# Patient Record
Sex: MALE | Race: WHITE | NOT HISPANIC OR LATINO | Employment: OTHER | URBAN - METROPOLITAN AREA
[De-identification: names, ages, dates, MRNs, and addresses within clinical notes are randomized per-mention and may not be internally consistent; named-entity substitution may affect disease eponyms.]

---

## 2022-06-13 ENCOUNTER — TELEPHONE (OUTPATIENT)
Dept: SCHEDULING | Facility: IMAGING CENTER | Age: 47
End: 2022-06-13

## 2022-06-14 ENCOUNTER — OFFICE VISIT (OUTPATIENT)
Dept: MEDICAL GROUP | Facility: PHYSICIAN GROUP | Age: 47
End: 2022-06-14
Payer: COMMERCIAL

## 2022-06-14 VITALS
TEMPERATURE: 97.3 F | BODY MASS INDEX: 26.45 KG/M2 | WEIGHT: 178.6 LBS | HEIGHT: 69 IN | OXYGEN SATURATION: 96 % | HEART RATE: 78 BPM | DIASTOLIC BLOOD PRESSURE: 84 MMHG | RESPIRATION RATE: 12 BRPM | SYSTOLIC BLOOD PRESSURE: 130 MMHG

## 2022-06-14 DIAGNOSIS — F41.8 DEPRESSION WITH ANXIETY: ICD-10-CM

## 2022-06-14 DIAGNOSIS — Z00.00 HEALTHCARE MAINTENANCE: ICD-10-CM

## 2022-06-14 DIAGNOSIS — F41.0 PANIC DISORDER WITHOUT AGORAPHOBIA: ICD-10-CM

## 2022-06-14 DIAGNOSIS — Z11.3 SCREENING EXAMINATION FOR STD (SEXUALLY TRANSMITTED DISEASE): ICD-10-CM

## 2022-06-14 DIAGNOSIS — F10.20 UNCOMPLICATED ALCOHOL DEPENDENCE (HCC): ICD-10-CM

## 2022-06-14 PROCEDURE — 99203 OFFICE O/P NEW LOW 30 MIN: CPT | Performed by: STUDENT IN AN ORGANIZED HEALTH CARE EDUCATION/TRAINING PROGRAM

## 2022-06-14 ASSESSMENT — ENCOUNTER SYMPTOMS
CLAUDICATION: 0
NAUSEA: 1
DEPRESSION: 1
HEARTBURN: 0
WEIGHT LOSS: 0
SPUTUM PRODUCTION: 0
BLOOD IN STOOL: 0
PALPITATIONS: 0
INSOMNIA: 1
COUGH: 0
NERVOUS/ANXIOUS: 1
FEVER: 0
SHORTNESS OF BREATH: 0
HEMOPTYSIS: 0
DIAPHORESIS: 0
HEADACHES: 0
CHILLS: 0
HALLUCINATIONS: 0
VOMITING: 1
ABDOMINAL PAIN: 1
DIZZINESS: 0
WHEEZING: 0

## 2022-06-14 ASSESSMENT — LIFESTYLE VARIABLES: SUBSTANCE_ABUSE: 1

## 2022-06-14 ASSESSMENT — PATIENT HEALTH QUESTIONNAIRE - PHQ9
CLINICAL INTERPRETATION OF PHQ2 SCORE: 3
5. POOR APPETITE OR OVEREATING: 0 - NOT AT ALL
SUM OF ALL RESPONSES TO PHQ QUESTIONS 1-9: 9

## 2022-06-14 NOTE — ASSESSMENT & PLAN NOTE
Patient here for a preventive medicine visit today and to establish care.  -Reviewed all past medical history, family history, social history    -Diet and exercise appropriate counseling given  -Social determinants of health reviewed  -Tobacco, alcohol, recreational drug use: alcohol concerns   -Occupation:   -Cholesterol screening: ordered  -Diabetes screening: fasting glucose ordered  -Blood pressure: 130/84    Immunizations/Infectious disease:  STI screening: ordered  Safe sex practices discusssed  HIV screening: ordered  Immunizations: Discussed COVID vaccinations at next visit next week.    Cancer screenings:  Colorectal cancer screening: no fam hx of colon cancer.  Lung Cancer Screening: Not indicated

## 2022-06-14 NOTE — ASSESSMENT & PLAN NOTE
Severe depression and anxiety with panic disorder.  Will bring in patient next week for evaluation and medication trial.  Referral to psychology for cognitive behavioral therapy.

## 2022-06-14 NOTE — PROGRESS NOTES
Subjective:   HISTORY OF THE PRESENT ILLNESS: Patient is a 46 y.o. male here today to establish care. His/her prior PCP was does not have one.    Problem   Panic Disorder Without Agoraphobia    Patient reports chronic panic disorder and anxiety.  He is vomiting today in office which he usually does when he is high anxiety.  He reports that he has been on medications including Ativan, Lexapro, Depakote in the past but nothing has really worked that well.     Depression With Anxiety    Depression and anxiety which is chronic.  He is under a lot of stress at work and dealing with his 12-year-old child.  He reports that he has been on Lexapro, Depakote, Ativan in the past but nothing new murmurs that had significantly helped him.  Reports that the Lexapro made him feel ill.  Has not seen a doctor or psychiatrist, psychologist in years.  Reports that his family has a strong history of psychiatric illness including his mother who is bipolar.  He denies any hallucinations, denies manic episodes.     Alcohol Dependency (Hcc)    Patient has long history of alcohol dependency to help with his anxiety.  Reports drinking 2-3 beers and few drinks per evening.  Reports that he has never had withdrawal symptoms after stopping for about a month at a time.     Healthcare Maintenance      No current Epic-ordered outpatient medications on file.     No current [facility]-ordered facility-administered medications on file.       No past medical history on file.  Past Surgical History:   Procedure Laterality Date   • FINGER AMPUTATION Left     middle   • MENISCECTOMY, KNEE, MEDIAL Left    • ORIF, SHOULDER Left    • TONSILLECTOMY       Social History     Tobacco Use   • Smoking status: Former Smoker     Packs/day: 0.50     Years: 5.00     Pack years: 2.50     Quit date:      Years since quittin.4   • Smokeless tobacco: Former User     Quit date:    Vaping Use   • Vaping Use: Never used   Substance Use Topics   • Alcohol use: Yes      "Alcohol/week: 12.0 oz     Types: 20 Standard drinks or equivalent per week   • Drug use: Yes     Types: Marijuana     Comment: also does mushrooms      Family History   Problem Relation Age of Onset   • Psychosis Mother    • Drug abuse Father    • Alcohol abuse Father    • No Known Problems Sister    • No Known Problems Brother      No current outpatient medications on file.     No current facility-administered medications for this visit.       Health Maintenance: Completed    Review of Systems   Constitutional: Negative for chills, diaphoresis, fever, malaise/fatigue and weight loss.   Respiratory: Negative for cough, hemoptysis, sputum production, shortness of breath and wheezing.    Cardiovascular: Negative for chest pain, palpitations, claudication and leg swelling.   Gastrointestinal: Positive for abdominal pain, nausea and vomiting. Negative for blood in stool, heartburn and melena.   Neurological: Negative for dizziness and headaches.   Psychiatric/Behavioral: Positive for depression and substance abuse. Negative for hallucinations and suicidal ideas. The patient is nervous/anxious and has insomnia.           Objective:     Exam: /84 (BP Location: Right arm, Patient Position: Sitting, BP Cuff Size: Adult)   Pulse 78   Temp 36.3 °C (97.3 °F) (Temporal)   Resp 12   Ht 1.753 m (5' 9\")   Wt 81 kg (178 lb 9.6 oz)   SpO2 96%  Body mass index is 26.37 kg/m².    Physical Exam  Vitals reviewed.   Constitutional:       General: He is not in acute distress.     Appearance: Normal appearance. He is not ill-appearing or toxic-appearing.   HENT:      Head: Normocephalic and atraumatic.   Eyes:      General: No scleral icterus.        Right eye: No discharge.         Left eye: No discharge.      Conjunctiva/sclera: Conjunctivae normal.   Neck:      Vascular: No carotid bruit.   Cardiovascular:      Rate and Rhythm: Normal rate and regular rhythm.      Pulses: Normal pulses.      Heart sounds: Normal heart " sounds. No murmur heard.  Pulmonary:      Effort: Pulmonary effort is normal. No respiratory distress.      Breath sounds: Normal breath sounds. No wheezing or rales.   Abdominal:      General: Abdomen is flat. Bowel sounds are normal. There is no distension.      Palpations: Abdomen is soft. There is no mass.      Tenderness: There is no abdominal tenderness. There is no guarding or rebound.   Musculoskeletal:      Cervical back: Neck supple.      Right lower leg: No edema.      Left lower leg: No edema.   Skin:     General: Skin is warm and dry.   Neurological:      General: No focal deficit present.      Mental Status: He is alert.   Psychiatric:         Behavior: Behavior normal.         Thought Content: Thought content normal.         Judgment: Judgment normal.      Comments: Anxious            Assessment & Plan:   46 y.o. male with the following     Problem List Items Addressed This Visit     Panic disorder without agoraphobia    Relevant Orders    Referral to Behavioral Health    Depression with anxiety     Severe depression and anxiety with panic disorder.  Will bring in patient next week for evaluation and medication trial.  Referral to psychology for cognitive behavioral therapy.            Relevant Orders    Referral to Behavioral Health    TSH WITH REFLEX TO FT4    Alcohol dependency (HCC)     Advised to discontinue as this is making his bloating and depression worse.  Advised that if he has symptoms of withdrawal like tremors, diaphoresis, agitation to return to care immediately as this could be life-threatening.  He understands and agrees.  We will see patient next week for further evaluation.           Healthcare maintenance       Patient here for a preventive medicine visit today and to establish care.  -Reviewed all past medical history, family history, social history    -Diet and exercise appropriate counseling given  -Social determinants of health reviewed  -Tobacco, alcohol, recreational drug  use: alcohol concerns   -Occupation:   -Cholesterol screening: ordered  -Diabetes screening: fasting glucose ordered  -Blood pressure: 130/84    Immunizations/Infectious disease:  STI screening: ordered  Safe sex practices discusssed  HIV screening: ordered  Immunizations: Discussed COVID vaccinations at next visit next week.    Cancer screenings:  Colorectal cancer screening: no fam hx of colon cancer.  Lung Cancer Screening: Not indicated              Relevant Orders    CBC WITHOUT DIFFERENTIAL    Comp Metabolic Panel    Lipid Profile    TSH WITH REFLEX TO FT4    VITAMIN D,25 HYDROXY      Other Visit Diagnoses     Screening examination for STD (sexually transmitted disease)        Relevant Orders    CHLAMYDIA/GC PCR URINE    HIV AG/AB COMBO ASSAY SCREENING    T.PALLIDUM AB EIA         25 minutes discussing patient's medical history, current symptoms.    Return in about 1 week (around 6/21/2022) for labs, symptoms.    Please note that this dictation was created using voice recognition software. I have made every reasonable attempt to correct obvious errors, but I expect that there are errors of grammar and possibly content that I did not discover before finalizing the note.

## 2022-06-14 NOTE — ASSESSMENT & PLAN NOTE
Advised to discontinue as this is making his bloating and depression worse.  Advised that if he has symptoms of withdrawal like tremors, diaphoresis, agitation to return to care immediately as this could be life-threatening.  He understands and agrees.  We will see patient next week for further evaluation.

## 2022-06-21 ENCOUNTER — OFFICE VISIT (OUTPATIENT)
Dept: MEDICAL GROUP | Facility: PHYSICIAN GROUP | Age: 47
End: 2022-06-21
Payer: COMMERCIAL

## 2022-06-21 VITALS
WEIGHT: 178.57 LBS | SYSTOLIC BLOOD PRESSURE: 126 MMHG | DIASTOLIC BLOOD PRESSURE: 84 MMHG | HEIGHT: 69 IN | HEART RATE: 65 BPM | TEMPERATURE: 97 F | BODY MASS INDEX: 26.45 KG/M2 | OXYGEN SATURATION: 98 % | RESPIRATION RATE: 16 BRPM

## 2022-06-21 DIAGNOSIS — F41.8 DEPRESSION WITH ANXIETY: ICD-10-CM

## 2022-06-21 DIAGNOSIS — Z00.00 HEALTHCARE MAINTENANCE: ICD-10-CM

## 2022-06-21 DIAGNOSIS — R79.89 LOW VITAMIN D LEVEL: ICD-10-CM

## 2022-06-21 PROCEDURE — 99213 OFFICE O/P EST LOW 20 MIN: CPT | Performed by: STUDENT IN AN ORGANIZED HEALTH CARE EDUCATION/TRAINING PROGRAM

## 2022-06-21 RX ORDER — MULTIVIT-MIN/IRON/FOLIC ACID/K 18-600-40
1000 CAPSULE ORAL DAILY
Qty: 120 TABLET | Refills: 2 | Status: SHIPPED | OUTPATIENT
Start: 2022-06-21

## 2022-06-21 RX ORDER — VENLAFAXINE HYDROCHLORIDE 75 MG/1
75 CAPSULE, EXTENDED RELEASE ORAL DAILY
Qty: 45 CAPSULE | Refills: 0 | Status: SHIPPED | OUTPATIENT
Start: 2022-06-21 | End: 2022-08-05

## 2022-06-21 RX ORDER — VENLAFAXINE HYDROCHLORIDE 37.5 MG/1
37.5 CAPSULE, EXTENDED RELEASE ORAL DAILY
Qty: 14 CAPSULE | Refills: 0 | Status: SHIPPED | OUTPATIENT
Start: 2022-06-21 | End: 2022-07-05

## 2022-06-21 ASSESSMENT — ENCOUNTER SYMPTOMS
HALLUCINATIONS: 0
NERVOUS/ANXIOUS: 1
SHORTNESS OF BREATH: 0
DEPRESSION: 1
INSOMNIA: 1
FEVER: 0
CHILLS: 0

## 2022-06-21 ASSESSMENT — LIFESTYLE VARIABLES: SUBSTANCE_ABUSE: 0

## 2022-06-21 NOTE — ASSESSMENT & PLAN NOTE
- He is open to trying a medication to help with his depression and anxiety.    -Reports that he has poor effects to Lexapro in the past.  rx effexor 37.5mg x 14d then 75mg    -rtc in 6 week for f/u  -Has behavioral health referral in place for CBT

## 2022-06-21 NOTE — PROGRESS NOTES
HISTORY OF PRESENT ILLNESS: Vinnie is a pleasant 46 y.o. male, established patient who presents today to discuss medical problems as listed below:    Problem   Low Vitamin D Level   Depression With Anxiety    Depression and anxiety which is chronic.  He is under a lot of stress at work and dealing with his 12-year-old child.  He reports that he has been on Lexapro, Depakote, Ativan in the past but nothing that had significantly helped him.  Reports that the Lexapro made him feel ill.  Has not seen a doctor or psychiatrist, psychologist in years.  Reports that his family has a strong history of psychiatric illness including his mother who is bipolar.  He denies any hallucinations, denies manic episodes.     Healthcare Maintenance        Current Outpatient Medications Ordered in Epic   Medication Sig Dispense Refill   • venlafaxine XR (EFFEXOR XR) 37.5 MG CAPSULE SR 24 HR Take 1 Capsule by mouth every day for 14 days. 14 Capsule 0   • venlafaxine XR (EFFEXOR XR) 75 MG CAPSULE SR 24 HR Take 1 Capsule by mouth every day for 45 days. Start after 14 days of 37.5mg dose 45 Capsule 0   • Vitamin D, Cholecalciferol, (CHOLECALCIFEROL) 25 MCG (1000 UT) Tab Take 1 Tablet by mouth every day. 120 Tablet 2     No current Epic-ordered facility-administered medications on file.       Review of Systems   Constitutional: Negative for chills and fever.   Respiratory: Negative for shortness of breath.    Cardiovascular: Negative for chest pain.   Psychiatric/Behavioral: Positive for depression. Negative for hallucinations and substance abuse. The patient is nervous/anxious and has insomnia.         History reviewed. No pertinent past medical history.  Past Surgical History:   Procedure Laterality Date   • FINGER AMPUTATION Left     middle   • MENISCECTOMY, KNEE, MEDIAL Left    • ORIF, SHOULDER Left    • TONSILLECTOMY       Social History     Tobacco Use   • Smoking status: Former Smoker     Packs/day: 0.50     Years: 5.00     Pack  "years: 2.50     Quit date:      Years since quittin.4   • Smokeless tobacco: Former User     Quit date:    Vaping Use   • Vaping Use: Never used   Substance Use Topics   • Alcohol use: Yes     Alcohol/week: 12.0 oz     Types: 20 Standard drinks or equivalent per week   • Drug use: Yes     Types: Marijuana     Comment: also does mushrooms      Family History   Problem Relation Age of Onset   • Psychosis Mother    • Drug abuse Father    • Alcohol abuse Father    • No Known Problems Sister    • No Known Problems Brother      Current Outpatient Medications   Medication Sig Dispense Refill   • venlafaxine XR (EFFEXOR XR) 37.5 MG CAPSULE SR 24 HR Take 1 Capsule by mouth every day for 14 days. 14 Capsule 0   • venlafaxine XR (EFFEXOR XR) 75 MG CAPSULE SR 24 HR Take 1 Capsule by mouth every day for 45 days. Start after 14 days of 37.5mg dose 45 Capsule 0   • Vitamin D, Cholecalciferol, (CHOLECALCIFEROL) 25 MCG (1000 UT) Tab Take 1 Tablet by mouth every day. 120 Tablet 2     No current facility-administered medications for this visit.       Allergies:  No Known Allergies    Allergies, past medical history, past surgical history, family history, social history reviewed and updated.    Objective:    /84   Pulse 65   Temp 36.1 °C (97 °F) (Temporal)   Resp 16   Ht 1.753 m (5' 9\")   Wt 81 kg (178 lb 9.2 oz)   SpO2 98%   BMI 26.37 kg/m²    Body mass index is 26.37 kg/m².    Physical Exam  Vitals reviewed.   Constitutional:       General: He is not in acute distress.     Appearance: Normal appearance. He is not ill-appearing or toxic-appearing.   HENT:      Head: Normocephalic and atraumatic.   Eyes:      General: No scleral icterus.        Right eye: No discharge.         Left eye: No discharge.      Conjunctiva/sclera: Conjunctivae normal.   Neck:      Vascular: No carotid bruit.   Cardiovascular:      Rate and Rhythm: Normal rate and regular rhythm.      Pulses: Normal pulses.      Heart sounds: Normal " heart sounds. No murmur heard.  Pulmonary:      Effort: Pulmonary effort is normal. No respiratory distress.      Breath sounds: Normal breath sounds. No wheezing or rales.   Abdominal:      General: Abdomen is flat. Bowel sounds are normal. There is no distension.      Palpations: Abdomen is soft. There is no mass.      Tenderness: There is no abdominal tenderness. There is no guarding or rebound.   Musculoskeletal:      Cervical back: Neck supple.      Right lower leg: No edema.      Left lower leg: No edema.   Skin:     General: Skin is warm and dry.   Neurological:      General: No focal deficit present.      Mental Status: He is alert.   Psychiatric:         Mood and Affect: Mood normal.         Thought Content: Thought content normal.          LABS:   Vitamin D 26  TSH 0.85, total cholesterol 219, , .    Assessment/Plan:    Problem List Items Addressed This Visit     Depression with anxiety     - He is open to trying a medication to help with his depression and anxiety.    -Reports that he has poor effects to Lexapro in the past.  rx effexor 37.5mg x 14d then 75mg    -rtc in 6 week for f/u  -Has behavioral health referral in place for CBT           Relevant Medications    venlafaxine XR (EFFEXOR XR) 37.5 MG CAPSULE SR 24 HR    venlafaxine XR (EFFEXOR XR) 75 MG CAPSULE SR 24 HR    Healthcare maintenance     Declines covid vaccination, questions answered.           Low vitamin D level     rx vitamin D 1000u daily.                   Return in about 6 weeks (around 8/2/2022) for symptoms, medication.

## 2022-06-24 DIAGNOSIS — F41.8 DEPRESSION WITH ANXIETY: ICD-10-CM

## 2022-06-24 DIAGNOSIS — F41.0 PANIC DISORDER WITHOUT AGORAPHOBIA: ICD-10-CM

## 2022-06-24 DIAGNOSIS — F39 MOOD DISORDER (HCC): ICD-10-CM

## 2022-08-09 ENCOUNTER — OFFICE VISIT (OUTPATIENT)
Dept: MEDICAL GROUP | Facility: PHYSICIAN GROUP | Age: 47
End: 2022-08-09
Payer: COMMERCIAL

## 2022-08-09 VITALS
HEIGHT: 69 IN | HEART RATE: 73 BPM | SYSTOLIC BLOOD PRESSURE: 118 MMHG | BODY MASS INDEX: 25.96 KG/M2 | RESPIRATION RATE: 12 BRPM | TEMPERATURE: 97.6 F | OXYGEN SATURATION: 95 % | DIASTOLIC BLOOD PRESSURE: 80 MMHG | WEIGHT: 175.27 LBS

## 2022-08-09 DIAGNOSIS — F41.8 DEPRESSION WITH ANXIETY: ICD-10-CM

## 2022-08-09 DIAGNOSIS — F10.20 UNCOMPLICATED ALCOHOL DEPENDENCE (HCC): ICD-10-CM

## 2022-08-09 DIAGNOSIS — F41.0 PANIC DISORDER WITHOUT AGORAPHOBIA: ICD-10-CM

## 2022-08-09 PROCEDURE — 99213 OFFICE O/P EST LOW 20 MIN: CPT | Performed by: STUDENT IN AN ORGANIZED HEALTH CARE EDUCATION/TRAINING PROGRAM

## 2022-08-09 NOTE — ASSESSMENT & PLAN NOTE
Lexapro, depakote, effexor are not well tolerated. Has psych appointment to day. Advised to rtc 3 months to make sure he is on track with treatment and care.     Chronic condition not well controlled. No SI.

## 2022-08-09 NOTE — PROGRESS NOTES
HISTORY OF PRESENT ILLNESS: Vinnie is a pleasant 47 y.o. male, established patient who presents today to discuss medical problems as listed below:    Problem   Depression With Anxiety    Depression and anxiety which is chronic.  He is under a lot of stress at work and dealing with his 12-year-old child.  He reports that he has been on Lexapro, Depakote, Ativan in the past but nothing that had significantly helped him.  Reports that the Lexapro made him feel ill.  Has not seen a doctor or psychiatrist, psychologist in years.  Reports that his family has a strong history of psychiatric illness including his mother who is bipolar.  He denies any hallucinations, denies manic episodes.    Was started on Effexor at the last visit: made him feel ill.      Alcohol Dependency (Hcc)    Patient has long history of alcohol dependency to help with his anxiety.  Reports drinking 2-3 beers and few drinks per evening.  Reports that he has never had withdrawal symptoms after stopping for about a month at a time. Does not believe he is ready to quit at this time          Current Outpatient Medications Ordered in Epic   Medication Sig Dispense Refill   • Vitamin D, Cholecalciferol, (CHOLECALCIFEROL) 25 MCG (1000 UT) Tab Take 1 Tablet by mouth every day. 120 Tablet 2     No current Epic-ordered facility-administered medications on file.       Review of systems:  Per HPI    History reviewed. No pertinent past medical history.  Past Surgical History:   Procedure Laterality Date   • FINGER AMPUTATION Left     middle   • MENISCECTOMY, KNEE, MEDIAL Left    • ORIF, SHOULDER Left    • TONSILLECTOMY       Social History     Tobacco Use   • Smoking status: Former Smoker     Packs/day: 0.50     Years: 5.00     Pack years: 2.50     Quit date:      Years since quittin.6   • Smokeless tobacco: Former User     Quit date:    Vaping Use   • Vaping Use: Never used   Substance Use Topics   • Alcohol use: Yes     Alcohol/week: 12.0 oz      "Types: 20 Standard drinks or equivalent per week   • Drug use: Yes     Types: Marijuana     Comment: also does mushrooms      Family History   Problem Relation Age of Onset   • Psychosis Mother    • Drug abuse Father    • Alcohol abuse Father    • No Known Problems Sister    • No Known Problems Brother      Current Outpatient Medications   Medication Sig Dispense Refill   • Vitamin D, Cholecalciferol, (CHOLECALCIFEROL) 25 MCG (1000 UT) Tab Take 1 Tablet by mouth every day. 120 Tablet 2     No current facility-administered medications for this visit.       Allergies:  Allergies   Allergen Reactions   • Trace Metals        Allergies, past medical history, past surgical history, family history, social history reviewed and updated.    Objective:    /80 (BP Location: Left arm, Patient Position: Sitting, BP Cuff Size: Adult)   Pulse 73   Temp 36.4 °C (97.6 °F) (Temporal)   Resp 12   Ht 1.753 m (5' 9\")   Wt 79.5 kg (175 lb 4.3 oz)   SpO2 95%   BMI 25.88 kg/m²    Body mass index is 25.88 kg/m².    Physical exam:  General: Normal appearance, no acute distress, not ill-appearing  HEENT: EOM intact, conjunctiva normal limits, negative right/left eye discharge.  Sclera anicteric  Cardiovascular: Normal rate and rhythm, no murmurs  Pulmonary: No respiratory distress, no wheezing, no rales, breath sounds normal.  Abdomen: Bowel sounds normal, flat, soft.  Musculoskeletal: No edema bilaterally  Skin: Warm, dry, no lesions  Neurological: No focal deficits, normal gait  Psychiatric: tearful    Assessment/Plan:    Problem List Items Addressed This Visit     Panic disorder without agoraphobia    Depression with anxiety     Lexapro, depakote, effexor are not well tolerated. Has psych appointment to day. Advised to rtc 3 months to make sure he is on track with treatment and care.     Chronic condition not well controlled. No SI.            Alcohol dependency (HCC)     Advised to return to care when he is ready to stop " drinking. For 2 cocktails and 2 beers a night I do not suspect withdrawal symptoms but none the less advised patient to inform me if he has any withdrawal type of symptoms if he stops drinking.                   Return in about 3 months (around 11/9/2022) for symptoms.     20 min discussing symptoms, plan of care.

## 2022-08-09 NOTE — ASSESSMENT & PLAN NOTE
Advised to return to care when he is ready to stop drinking. For 2 cocktails and 2 beers a night I do not suspect withdrawal symptoms but none the less advised patient to inform me if he has any withdrawal type of symptoms if he stops drinking.

## 2022-08-23 ENCOUNTER — TELEMEDICINE (OUTPATIENT)
Dept: MEDICAL GROUP | Facility: PHYSICIAN GROUP | Age: 47
End: 2022-08-23
Payer: COMMERCIAL

## 2022-08-23 VITALS — HEIGHT: 69 IN | WEIGHT: 175 LBS | BODY MASS INDEX: 25.92 KG/M2

## 2022-08-23 DIAGNOSIS — R10.84 GENERALIZED ABDOMINAL PAIN: ICD-10-CM

## 2022-08-23 PROBLEM — R10.9 ABDOMINAL PAIN: Status: ACTIVE | Noted: 2022-08-23

## 2022-08-23 PROCEDURE — 99214 OFFICE O/P EST MOD 30 MIN: CPT | Mod: 95 | Performed by: STUDENT IN AN ORGANIZED HEALTH CARE EDUCATION/TRAINING PROGRAM

## 2022-08-23 RX ORDER — METOCLOPRAMIDE 5 MG/1
5 TABLET ORAL 3 TIMES DAILY PRN
Qty: 90 TABLET | Refills: 0 | Status: SHIPPED | OUTPATIENT
Start: 2022-08-23 | End: 2022-11-09

## 2022-08-23 NOTE — ASSESSMENT & PLAN NOTE
Unclear etiology, irritable bowel syndrome versus PUD.    Abdominal ultrasound ordered for feeling of the hernia  Labs ordered including H. pylori, CMP, lipase.  Reglan for nausea, bloating.  Continue stool softeners.    Referral to GI placed for chronic abdominal pain.    Follow-up 1 week in person.

## 2022-08-23 NOTE — PROGRESS NOTES
"Virtual Visit: Established Patient   This visit was conducted via Zoom using secure and encrypted videoconferencing technology.   The patient was in their home in the [facility] [place].    The patient's identity was confirmed and verbal consent was obtained for this virtual visit.    Subjective:   CC:   Chief Complaint   Patient presents with    GI Problem     Bloated/Lump on stomach     Vinnie Beltran is a 47 y.o. male presenting for evaluation and management of:    Problem   Abdominal Pain    Patient has chronic now worsened generalized abd pain. Also starting to see a bump above the umbilicus when straining.  His most severe symptom is the bloating he gets when eating or drinking.  He denies any nausea or vomiting, diarrhea, constipation, melena or black stools, fevers.  Denies particular right upper quadrant pain after eating.  Most of his abdominal pain up until now has been associated with his anxiety.  He does have a history of alcohol dependency but has been sober recently.          ROS   Per hpi    Current medicines (including changes today)  Current Outpatient Medications   Medication Sig Dispense Refill    metoclopramide (REGLAN) 5 MG tablet Take 1 Tablet by mouth 3 times a day as needed (nausea, bloating). 90 Tablet 0    Vitamin D, Cholecalciferol, (CHOLECALCIFEROL) 25 MCG (1000 UT) Tab Take 1 Tablet by mouth every day. 120 Tablet 2     No current facility-administered medications for this visit.       Patient Active Problem List    Diagnosis Date Noted    Abdominal pain 08/23/2022    Low vitamin D level 06/21/2022    Panic disorder without agoraphobia 06/14/2022    Depression with anxiety 06/14/2022    Alcohol dependency (HCC) 06/14/2022    Healthcare maintenance 06/14/2022        Objective:   Ht 1.753 m (5' 9\")   Wt 79.4 kg (175 lb)   BMI 25.84 kg/m²     Physical Exam:  Constitutional: Alert, no distress, well-groomed.  Skin: No rashes in visible areas.  Eye: Round. Conjunctiva clear, lids normal. No " icterus.   ENMT: Lips pink without lesions, good dentition, moist mucous membranes. Phonation normal.  Neck: No masses, no thyromegaly. Moves freely without pain.  Respiratory: Unlabored respiratory effort, no cough or audible wheeze  Psych: Alert and oriented x3, normal affect and mood.     Assessment and Plan:   The following treatment plan was discussed:     Problem List Items Addressed This Visit       Abdominal pain     Unclear etiology, irritable bowel syndrome versus PUD.    Abdominal ultrasound ordered for feeling of the hernia  Labs ordered including H. pylori, CMP, lipase.  Reglan for nausea, bloating.  Continue stool softeners.    Referral to GI placed for chronic abdominal pain.    Follow-up 1 week in person.         Relevant Medications    metoclopramide (REGLAN) 5 MG tablet    Other Relevant Orders    H. PYLORI, UREA BREATH TEST, ADULT    Comp Metabolic Panel    US-ABDOMEN COMPLETE SURVEY    CBC WITHOUT DIFFERENTIAL    Referral to Gastroenterology    LIPASE        Follow-up: Return in about 1 week (around 8/30/2022) for labs, symptoms.

## 2022-08-26 ENCOUNTER — APPOINTMENT (OUTPATIENT)
Dept: RADIOLOGY | Facility: MEDICAL CENTER | Age: 47
End: 2022-08-26
Attending: STUDENT IN AN ORGANIZED HEALTH CARE EDUCATION/TRAINING PROGRAM
Payer: COMMERCIAL

## 2022-08-26 DIAGNOSIS — R10.84 GENERALIZED ABDOMINAL PAIN: ICD-10-CM

## 2022-08-26 PROCEDURE — 76700 US EXAM ABDOM COMPLETE: CPT

## 2022-09-09 ENCOUNTER — OFFICE VISIT (OUTPATIENT)
Dept: MEDICAL GROUP | Facility: PHYSICIAN GROUP | Age: 47
End: 2022-09-09
Payer: COMMERCIAL

## 2022-09-09 VITALS
SYSTOLIC BLOOD PRESSURE: 120 MMHG | OXYGEN SATURATION: 94 % | BODY MASS INDEX: 25.96 KG/M2 | WEIGHT: 175.27 LBS | HEIGHT: 69 IN | HEART RATE: 65 BPM | RESPIRATION RATE: 12 BRPM | TEMPERATURE: 96.9 F | DIASTOLIC BLOOD PRESSURE: 78 MMHG

## 2022-09-09 DIAGNOSIS — G89.29 CHRONIC INTRACTABLE HEADACHE, UNSPECIFIED HEADACHE TYPE: ICD-10-CM

## 2022-09-09 DIAGNOSIS — R51.9 CHRONIC INTRACTABLE HEADACHE, UNSPECIFIED HEADACHE TYPE: ICD-10-CM

## 2022-09-09 DIAGNOSIS — Z87.820 HISTORY OF CONCUSSION: ICD-10-CM

## 2022-09-09 DIAGNOSIS — R10.84 GENERALIZED ABDOMINAL PAIN: ICD-10-CM

## 2022-09-09 DIAGNOSIS — R22.2 ABDOMINAL WALL MASS: ICD-10-CM

## 2022-09-09 PROCEDURE — 99214 OFFICE O/P EST MOD 30 MIN: CPT | Performed by: STUDENT IN AN ORGANIZED HEALTH CARE EDUCATION/TRAINING PROGRAM

## 2022-09-09 RX ORDER — ACETAMINOPHEN 325 MG/1
325 TABLET ORAL EVERY 4 HOURS PRN
Qty: 120 TABLET | Refills: 1 | Status: SHIPPED | OUTPATIENT
Start: 2022-09-09

## 2022-09-09 RX ORDER — OMEPRAZOLE 40 MG/1
40 CAPSULE, DELAYED RELEASE ORAL DAILY
Qty: 60 CAPSULE | Refills: 0 | Status: SHIPPED | OUTPATIENT
Start: 2022-09-09 | End: 2023-03-14

## 2022-09-09 NOTE — PROGRESS NOTES
HISTORY OF PRESENT ILLNESS: Vinnie is a pleasant 47 y.o. male, established patient who presents today to discuss medical problems as listed below:    Problem   Chronic Intractable Headache    Patient reports that following a concussion earlier this year he has been having severe chronic headaches.  He had returned to work immediately after having a concussion.  He is having trouble concentrating, reading, focusing.  Denies any auras.     History of Concussion   Abdominal Wall Mass   Abdominal Pain    Patient was seen last visit for acute on chronic abd pain, bloating and what he felt was a mass bulging out of his abdomen.  He was sent for labs, abdominal ultrasound.  He reports that he is still having the bloating and generalized abdominal pain denies any fevers, nausea, vomiting, diarrhea.  The mass protrudes from his abdomen when he is doing exercises involving his abdominal muscles.  He uses ibuprofen daily for years now, denies any melena          Current Outpatient Medications Ordered in Epic   Medication Sig Dispense Refill    omeprazole (PRILOSEC) 40 MG delayed-release capsule Take 1 Capsule by mouth every day. 60 Capsule 0    acetaminophen (TYLENOL) 325 MG Tab Take 1 Tablet by mouth every four hours as needed for Mild Pain. 120 Tablet 1    metoclopramide (REGLAN) 5 MG tablet Take 1 Tablet by mouth 3 times a day as needed (nausea, bloating). 90 Tablet 0    Vitamin D, Cholecalciferol, (CHOLECALCIFEROL) 25 MCG (1000 UT) Tab Take 1 Tablet by mouth every day. 120 Tablet 2     No current Epic-ordered facility-administered medications on file.       Review of systems:  Per HPI    History reviewed. No pertinent past medical history.  Past Surgical History:   Procedure Laterality Date    FINGER AMPUTATION Left     middle    MENISCECTOMY, KNEE, MEDIAL Left     ORIF, SHOULDER Left     TONSILLECTOMY       Social History     Tobacco Use    Smoking status: Former     Packs/day: 0.50     Years: 5.00     Pack years: 2.50  "    Types: Cigarettes     Quit date:      Years since quittin.6    Smokeless tobacco: Former     Quit date:    Vaping Use    Vaping Use: Never used   Substance Use Topics    Alcohol use: Yes     Alcohol/week: 12.0 oz     Types: 20 Standard drinks or equivalent per week    Drug use: Yes     Types: Marijuana     Comment: also does mushrooms      Family History   Problem Relation Age of Onset    Psychosis Mother     Drug abuse Father     Alcohol abuse Father     No Known Problems Sister     No Known Problems Brother      Current Outpatient Medications   Medication Sig Dispense Refill    omeprazole (PRILOSEC) 40 MG delayed-release capsule Take 1 Capsule by mouth every day. 60 Capsule 0    acetaminophen (TYLENOL) 325 MG Tab Take 1 Tablet by mouth every four hours as needed for Mild Pain. 120 Tablet 1    metoclopramide (REGLAN) 5 MG tablet Take 1 Tablet by mouth 3 times a day as needed (nausea, bloating). 90 Tablet 0    Vitamin D, Cholecalciferol, (CHOLECALCIFEROL) 25 MCG (1000 UT) Tab Take 1 Tablet by mouth every day. 120 Tablet 2     No current facility-administered medications for this visit.       Allergies:  Allergies   Allergen Reactions    Trace Metals        Allergies, past medical history, past surgical history, family history, social history reviewed and updated.    Objective:    /78   Pulse 65   Temp 36.1 °C (96.9 °F) (Temporal)   Resp 12   Ht 1.753 m (5' 9\")   Wt 79.5 kg (175 lb 4.3 oz)   SpO2 94%   BMI 25.88 kg/m²    Body mass index is 25.88 kg/m².    Physical exam:  General: Normal appearance, no acute distress, not ill-appearing  HEENT: EOM intact, conjunctiva normal limits, negative right/left eye discharge.  Sclera anicteric  Cardiovascular: Normal rate and rhythm, no murmurs  Pulmonary: No respiratory distress, no wheezing, no rales, breath sounds normal.  Abdomen: Bowel sounds normal, flat, soft.  Musculoskeletal: No edema bilaterally  Skin: Warm, dry, no lesions  Neurological: " No focal deficits, normal gait  Psychiatric: Mood within normal limits    Assessment/Plan:    Problem List Items Addressed This Visit       Abdominal pain     H. pylori test was not done as the equipment had failed.  Reglan is helping him, advised to continue on we will add Prilosec 40 mg daily.  He has a GI referral in place, I suspect he has gastritis or PUD from chronic use of NSAIDs.    Abdominal ultrasound shows no hernia, on exam today prominent muscular tissue when straining ab muscles.  We will continue to watch this, likely a abdominal muscle strain.  Advise heating pad, rest from doing daily abdominal exercises.         Relevant Medications    omeprazole (PRILOSEC) 40 MG delayed-release capsule    Chronic intractable headache     Advised to discontinue ibuprofen as it may be causing his abdominal issues.  Start Tylenol as needed.  Referral sent to the headache clinic for neurology         Relevant Medications    acetaminophen (TYLENOL) 325 MG Tab    History of concussion    Relevant Orders    Referral to Neurology    Abdominal wall mass        Return in about 4 weeks (around 10/7/2022).

## 2022-09-09 NOTE — ASSESSMENT & PLAN NOTE
H. pylori test was not done as the equipment had failed.  Reglan is helping him, advised to continue on we will add Prilosec 40 mg daily.  He has a GI referral in place, I suspect he has gastritis or PUD from chronic use of NSAIDs.    Abdominal ultrasound shows no hernia, on exam today prominent muscular tissue when straining ab muscles.  We will continue to watch this, likely a abdominal muscle strain.  Advise heating pad, rest from doing daily abdominal exercises.

## 2022-09-09 NOTE — ASSESSMENT & PLAN NOTE
Advised to discontinue ibuprofen as it may be causing his abdominal issues.  Start Tylenol as needed.  Referral sent to the headache clinic for neurology

## 2022-11-09 ENCOUNTER — OFFICE VISIT (OUTPATIENT)
Dept: MEDICAL GROUP | Facility: PHYSICIAN GROUP | Age: 47
End: 2022-11-09
Payer: COMMERCIAL

## 2022-11-09 VITALS
RESPIRATION RATE: 12 BRPM | BODY MASS INDEX: 27.07 KG/M2 | DIASTOLIC BLOOD PRESSURE: 80 MMHG | HEART RATE: 83 BPM | WEIGHT: 182.76 LBS | TEMPERATURE: 97.8 F | SYSTOLIC BLOOD PRESSURE: 122 MMHG | HEIGHT: 69 IN | OXYGEN SATURATION: 96 %

## 2022-11-09 DIAGNOSIS — R10.84 GENERALIZED ABDOMINAL PAIN: ICD-10-CM

## 2022-11-09 DIAGNOSIS — F10.20 UNCOMPLICATED ALCOHOL DEPENDENCE (HCC): ICD-10-CM

## 2022-11-09 DIAGNOSIS — R51.9 CHRONIC INTRACTABLE HEADACHE, UNSPECIFIED HEADACHE TYPE: ICD-10-CM

## 2022-11-09 DIAGNOSIS — G89.29 CHRONIC INTRACTABLE HEADACHE, UNSPECIFIED HEADACHE TYPE: ICD-10-CM

## 2022-11-09 PROCEDURE — 99213 OFFICE O/P EST LOW 20 MIN: CPT | Performed by: STUDENT IN AN ORGANIZED HEALTH CARE EDUCATION/TRAINING PROGRAM

## 2022-11-09 RX ORDER — PROCHLORPERAZINE MALEATE 10 MG
10 TABLET ORAL EVERY 6 HOURS PRN
COMMUNITY
End: 2023-03-14

## 2022-11-09 RX ORDER — IBUPROFEN 400 MG/1
400 TABLET ORAL EVERY 6 HOURS PRN
COMMUNITY

## 2022-11-09 RX ORDER — AMITRIPTYLINE HYDROCHLORIDE 10 MG/1
10 TABLET, FILM COATED ORAL NIGHTLY
COMMUNITY
End: 2023-03-14

## 2022-11-09 RX ORDER — DEXAMETHASONE 4 MG/1
TABLET ORAL 2 TIMES DAILY
COMMUNITY
End: 2023-03-14

## 2022-11-09 NOTE — ASSESSMENT & PLAN NOTE
Continue follow-up by neurology.Continue current medications dexamethasone, amitriptyline, Compazine.

## 2022-11-09 NOTE — ASSESSMENT & PLAN NOTE
Advised and recommended again that he cut down on alcohol use.  He has been using it to help deal with stress and anxiety, he has declined therapy at this time as well.  He reports that he has never had withdrawal symptoms and can cut down on his own.

## 2022-11-09 NOTE — PROGRESS NOTES
HISTORY OF PRESENT ILLNESS: Vinnie is a pleasant 47 y.o. male, established patient who presents today to discuss medical problems as listed below:    Problem   Chronic Intractable Headache    Patient here to follow-up on his chronic intractable headaches.  He recently saw neurology yesterday and is being prescribed dexamethasone, amitriptyline and Compazine.  They have also ordered an MRI of his head and neck for further evaluation.  Today he feels well after taking the medications last night.  He has some fatigue but feels much relief from his headaches.     Abdominal Pain    Chronic abdominal pain likely related to irritable bowel syndrome.  He recently saw gastroenterology and had an EGD and colonoscopy done showing no significant findings.  He reports the amitriptyline is helping     Alcohol Dependency (Hcc)    Patient has long history of alcohol dependency to help with his anxiety.  He is currently drinking about 2-3 beers at night with 1 shot of liquor.           Current Outpatient Medications Ordered in Epic   Medication Sig Dispense Refill    ibuprofen (MOTRIN) 400 MG Tab Take 1 Tablet by mouth every 6 hours as needed.      dexamethasone (DECADRON) 4 MG Tab Take  by mouth 2 times a day.      prochlorperazine (COMPAZINE) 10 MG Tab Take 1 Tablet by mouth every 6 hours as needed.      amitriptyline (ELAVIL) 10 MG Tab Take 1 Tablet by mouth every evening.      omeprazole (PRILOSEC) 40 MG delayed-release capsule Take 1 Capsule by mouth every day. 60 Capsule 0    acetaminophen (TYLENOL) 325 MG Tab Take 1 Tablet by mouth every four hours as needed for Mild Pain. 120 Tablet 1    Vitamin D, Cholecalciferol, (CHOLECALCIFEROL) 25 MCG (1000 UT) Tab Take 1 Tablet by mouth every day. 120 Tablet 2     No current Epic-ordered facility-administered medications on file.       Review of systems:  Per HPI    No past medical history on file.  Past Surgical History:   Procedure Laterality Date    FINGER AMPUTATION Left      "middle    MENISCECTOMY, KNEE, MEDIAL Left     ORIF, SHOULDER Left     TONSILLECTOMY       Social History     Tobacco Use    Smoking status: Former     Packs/day: 0.50     Years: 5.00     Pack years: 2.50     Types: Cigarettes     Quit date:      Years since quittin.8    Smokeless tobacco: Former     Quit date:    Vaping Use    Vaping Use: Never used   Substance Use Topics    Alcohol use: Yes     Alcohol/week: 12.6 oz     Types: 14 Cans of beer, 7 Shots of liquor per week    Drug use: Yes     Types: Marijuana     Comment: also does mushrooms      Family History   Problem Relation Age of Onset    Psychosis Mother     Drug abuse Father     Alcohol abuse Father     No Known Problems Sister     No Known Problems Brother      Current Outpatient Medications   Medication Sig Dispense Refill    ibuprofen (MOTRIN) 400 MG Tab Take 1 Tablet by mouth every 6 hours as needed.      dexamethasone (DECADRON) 4 MG Tab Take  by mouth 2 times a day.      prochlorperazine (COMPAZINE) 10 MG Tab Take 1 Tablet by mouth every 6 hours as needed.      amitriptyline (ELAVIL) 10 MG Tab Take 1 Tablet by mouth every evening.      omeprazole (PRILOSEC) 40 MG delayed-release capsule Take 1 Capsule by mouth every day. 60 Capsule 0    acetaminophen (TYLENOL) 325 MG Tab Take 1 Tablet by mouth every four hours as needed for Mild Pain. 120 Tablet 1    Vitamin D, Cholecalciferol, (CHOLECALCIFEROL) 25 MCG (1000 UT) Tab Take 1 Tablet by mouth every day. 120 Tablet 2     No current facility-administered medications for this visit.       Allergies:  Allergies   Allergen Reactions    Trace Metals        Allergies, past medical history, past surgical history, family history, social history reviewed and updated.    Objective:    /80 (BP Location: Left arm, Patient Position: Sitting, BP Cuff Size: Adult)   Pulse 83   Temp 36.6 °C (97.8 °F) (Temporal)   Resp 12   Ht 1.753 m (5' 9\")   Wt 82.9 kg (182 lb 12.2 oz)   SpO2 96%   BMI 26.99 " kg/m²    Body mass index is 26.99 kg/m².    Physical exam:  General: Normal appearance, no acute distress, not ill-appearing  HEENT: EOM intact, conjunctiva normal limits, negative right/left eye discharge.  Sclera anicteric  Cardiovascular: Normal rate and rhythm, no murmurs  Pulmonary: No respiratory distress, no wheezing, no rales, breath sounds normal.  Abdomen: Bowel sounds normal, flat, soft.  Musculoskeletal: No edema bilaterally  Skin: Warm, dry, no lesions  Neurological: No focal deficits, normal gait  Psychiatric: Mood within normal limits    Assessment/Plan:    Problem List Items Addressed This Visit       Alcohol dependency (HCC)     Advised and recommended again that he cut down on alcohol use.  He has been using it to help deal with stress and anxiety, he has declined therapy at this time as well.  He reports that he has never had withdrawal symptoms and can cut down on his own.         Abdominal pain     Request records from gastroenterology.  Stable, chronic condition improving.         Chronic intractable headache     Continue follow-up by neurology.Continue current medications dexamethasone, amitriptyline, Compazine.             Return in about 4 months (around 3/9/2023) for symptoms.

## 2022-11-09 NOTE — LETTER
[employer]  Satya Gonzalez D.O.  [address]  [place] [address]  Fax: [phone]   Authorization for Release/Disclosure of   Protected Health Information   Name: MARITZA ROBERTS : 1975 SSN: [id number]   Address: [address] [place] [address] Phone:    [phone] (home)    I authorize the entity listed below to release/disclose the PHI below to:   [facility]/Satya Gonzalez D.O. and Satya Gonzalez D.O.   Provider or Entity Name:  [facility]  [address] [place] [address] [place] [address]   Address   City, [facility], [facility]   Phone:      Fax:     Reason for request: continuity of care   Information to be released:    [ x  ] LAST COLONOSCOPY,  including any PATH REPORT and follow-up  [  ] LAST FIT/COLOGUARD RESULT [  ] LAST DEXA  [  ] LAST MAMMOGRAM  [  ] LAST PAP  [  ] LAST LABS [  ] RETINA EXAM REPORT  [  ] IMMUNIZATION RECORDS  [  ] Release all info      [  ] Check here and initial the line next to each item to release ALL health information INCLUDING  _____ Care and treatment for drug and / or alcohol abuse  _____ HIV testing, infection status, or AIDS  _____ Genetic Testing    DATES OF SERVICE OR TIME PERIOD TO BE DISCLOSED: _____________  I understand and acknowledge that:  * This Authorization may be revoked at any time by you in writing, except if your health information has already been used or disclosed.  * Your health information that will be used or disclosed as a result of you signing this authorization could be re-disclosed by the recipient. If this occurs, your re-disclosed health information may no longer be protected by State or Federal laws.  * You may refuse to sign this Authorization. Your refusal will not affect your ability to obtain treatment.  * This Authorization becomes effective upon signing and will  on (date) __________.      If no date is indicated, this Authorization will  one (1) year from the signature date.    Name: Maritza  Devin    Signature:   Date:     11/9/2022       PLEASE FAX REQUESTED RECORDS BACK TO: [phone]

## 2023-03-14 ENCOUNTER — OFFICE VISIT (OUTPATIENT)
Dept: MEDICAL GROUP | Facility: PHYSICIAN GROUP | Age: 48
End: 2023-03-14
Payer: COMMERCIAL

## 2023-03-14 VITALS
WEIGHT: 192.24 LBS | RESPIRATION RATE: 12 BRPM | HEIGHT: 69 IN | DIASTOLIC BLOOD PRESSURE: 84 MMHG | OXYGEN SATURATION: 97 % | TEMPERATURE: 97.8 F | BODY MASS INDEX: 28.47 KG/M2 | SYSTOLIC BLOOD PRESSURE: 122 MMHG | HEART RATE: 82 BPM

## 2023-03-14 DIAGNOSIS — Z11.59 NEED FOR HEPATITIS C SCREENING TEST: ICD-10-CM

## 2023-03-14 DIAGNOSIS — R22.2 ABDOMINAL WALL MASS: ICD-10-CM

## 2023-03-14 DIAGNOSIS — G89.29 CHRONIC INTRACTABLE HEADACHE, UNSPECIFIED HEADACHE TYPE: ICD-10-CM

## 2023-03-14 DIAGNOSIS — F10.20 UNCOMPLICATED ALCOHOL DEPENDENCE (HCC): ICD-10-CM

## 2023-03-14 DIAGNOSIS — F41.8 DEPRESSION WITH ANXIETY: ICD-10-CM

## 2023-03-14 DIAGNOSIS — R51.9 CHRONIC INTRACTABLE HEADACHE, UNSPECIFIED HEADACHE TYPE: ICD-10-CM

## 2023-03-14 PROCEDURE — 99213 OFFICE O/P EST LOW 20 MIN: CPT | Performed by: STUDENT IN AN ORGANIZED HEALTH CARE EDUCATION/TRAINING PROGRAM

## 2023-03-14 ASSESSMENT — PATIENT HEALTH QUESTIONNAIRE - PHQ9: CLINICAL INTERPRETATION OF PHQ2 SCORE: 0

## 2023-03-14 NOTE — ASSESSMENT & PLAN NOTE
Patient being followed by neurology in [place].  We will request records.  He recently had a CTA of his head done showing no significant abnormalities.  He is currently not taking any medications and is not experiencing his headaches.  Chronic, stable condition

## 2023-03-14 NOTE — ASSESSMENT & PLAN NOTE
Patient declining treatment, reports improving symptoms after stressors in his life for resolving.  He denies wanting any treatment or to see therapy at this time.  Return to care as needed.  Chronic, stable condition improving.

## 2023-03-14 NOTE — PROGRESS NOTES
HISTORY OF PRESENT ILLNESS: Vinnie is a pleasant 47 y.o. male, established patient who presents today to discuss medical problems as listed below:    #Depression, anxiety  #Panic disorder  Patient reports that he is doing better since his last visit.  He reports the stressors in his life are a lot less, he still having panic attacks but less in frequency.  He does not do well with most medications and would like to hold off on treatment.  He was seeing a therapist until a few months ago where they had moved their office to [place].    #Alcohol dependency  Patient reports that he has also decreased his consumption of alcohol.  He is down to 1-2 beers a night with 1 shot of liquor.  He does go some days without doing this and has no withdrawal symptoms.  He is trying to down more as he can    #Abdominal hernia  Patient reports that he has a persistent abdominal ventral hernia above his umbilicus.  He reports he had an ultrasound done for this in the past however the ultrasound tech had never examine the area.  He would like to get a repeat ultrasound to confirm.  Denies any constipation, black or bloody stools, nausea, vomiting.  The hernia is able to be pushed in and bulges out when bearing down.    Current Outpatient Medications Ordered in Epic   Medication Sig Dispense Refill    ibuprofen (MOTRIN) 400 MG Tab Take 1 Tablet by mouth every 6 hours as needed.      acetaminophen (TYLENOL) 325 MG Tab Take 1 Tablet by mouth every four hours as needed for Mild Pain. 120 Tablet 1    Vitamin D, Cholecalciferol, (CHOLECALCIFEROL) 25 MCG (1000 UT) Tab Take 1 Tablet by mouth every day. 120 Tablet 2     No current Epic-ordered facility-administered medications on file.       Review of systems:  Per HPI    History reviewed. No pertinent past medical history.  Past Surgical History:   Procedure Laterality Date    FINGER AMPUTATION Left     middle    MENISCECTOMY, KNEE, MEDIAL Left     ORIF, SHOULDER Left     TONSILLECTOMY    "    Social History     Tobacco Use    Smoking status: Former     Packs/day: 0.50     Years: 5.00     Pack years: 2.50     Types: Cigarettes     Quit date: 2020     Years since quitting: 3.2    Smokeless tobacco: Former     Quit date: 2022   Vaping Use    Vaping Use: Never used   Substance Use Topics    Alcohol use: Yes     Alcohol/week: 12.6 oz     Types: 14 Cans of beer, 7 Shots of liquor per week    Drug use: Yes     Types: Marijuana     Comment: also does mushrooms      Family History   Problem Relation Age of Onset    Psychosis Mother     Drug abuse Father     Alcohol abuse Father     No Known Problems Sister     No Known Problems Brother      Current Outpatient Medications   Medication Sig Dispense Refill    ibuprofen (MOTRIN) 400 MG Tab Take 1 Tablet by mouth every 6 hours as needed.      acetaminophen (TYLENOL) 325 MG Tab Take 1 Tablet by mouth every four hours as needed for Mild Pain. 120 Tablet 1    Vitamin D, Cholecalciferol, (CHOLECALCIFEROL) 25 MCG (1000 UT) Tab Take 1 Tablet by mouth every day. 120 Tablet 2     No current facility-administered medications for this visit.       Allergies:  Allergies   Allergen Reactions    Trace Metals        Allergies, past medical history, past surgical history, family history, social history reviewed and updated.    Objective:    /84 (BP Location: Left arm, Patient Position: Sitting, BP Cuff Size: Adult long)   Pulse 82   Temp 36.6 °C (97.8 °F) (Temporal)   Resp 12   Ht 1.753 m (5' 9\")   Wt 87.2 kg (192 lb 3.9 oz)   SpO2 97%   BMI 28.39 kg/m²    Body mass index is 28.39 kg/m².    Physical exam:  General: Normal appearance, no acute distress, not ill-appearing  HEENT: EOM intact, conjunctiva normal limits, negative right/left eye discharge.  Sclera anicteric  Cardiovascular: Normal rate and rhythm, no murmurs  Pulmonary: No respiratory distress, no wheezing, no rales, breath sounds normal.  Abdomen: Bowel sounds normal, flat, soft.  Slight abdominal mass " appreciated epigastric above the umbilicus.  Musculoskeletal: No edema bilaterally  Skin: Warm, dry, no lesions  Neurological: No focal deficits, normal gait  Psychiatric: Mood within normal limits    Assessment/Plan:    Problem List Items Addressed This Visit       Depression with anxiety     Patient declining treatment, reports improving symptoms after stressors in his life for resolving.  He denies wanting any treatment or to see therapy at this time.  Return to care as needed.  Chronic, stable condition improving.         Alcohol dependency (HCC)     Patient is down to 1-2 beers and 1 shot of liquor per night.  He denies any withdrawal symptoms if he goes for a period of time without.  Advised patient to continue cutting out the alcohol and if he needs help with this to please return to care.          Relevant Orders    Comp Metabolic Panel    CBC WITHOUT DIFFERENTIAL    Chronic intractable headache     Patient being followed by neurology in [place].  We will request records.  He recently had a CTA of his head done showing no significant abnormalities.  He is currently not taking any medications and is not experiencing his headaches.  Chronic, stable condition         Abdominal wall mass    Relevant Orders    US-ABDOMEN LTD (SOFT TISSUE)     Other Visit Diagnoses       BMI 28.0-28.9,adult        Relevant Orders    HEMOGLOBIN A1C    Need for hepatitis C screening test        Relevant Orders    HEP C VIRUS ANTIBODY             Return in about 7 months (around 10/14/2023) for Mood.

## 2023-03-14 NOTE — ASSESSMENT & PLAN NOTE
Patient is down to 1-2 beers and 1 shot of liquor per night.  He denies any withdrawal symptoms if he goes for a period of time without.  Advised patient to continue cutting out the alcohol and if he needs help with this to please return to care.

## 2023-03-14 NOTE — LETTER
[facility]  Satya Gonzalez D.O.  [address]  [facility] [address]  Fax: [phone]   Authorization for Release/Disclosure of   Protected Health Information   Name: MARITZA ROBERTS : 1975 SSN: [id number]   Address: [address] [place] [address] Phone:    [phone] (home)    I authorize the entity listed below to release/disclose the PHI below to:   [facility]/Satya Gonzalez D.O. and Satya Gonzalez D.O.   Provider or Entity Name:  Rachael Farris   Address   City, State, [facility]   Phone:      Fax:     Reason for request: continuity of care   Information to be released:    [  ] LAST COLONOSCOPY,  including any PATH REPORT and follow-up  [  ] LAST FIT/COLOGUARD RESULT [  ] LAST DEXA  [  ] LAST MAMMOGRAM  [  ] LAST PAP  [  ] LAST LABS [  ] RETINA EXAM REPORT  [  ] IMMUNIZATION RECORDS  [  X ] Release all info      [  ] Check here and initial the line next to each item to release ALL health information INCLUDING  _____ Care and treatment for drug and / or alcohol abuse  _____ HIV testing, infection status, or AIDS  _____ Genetic Testing    DATES OF SERVICE OR TIME PERIOD TO BE DISCLOSED: _____________  I understand and acknowledge that:  * This Authorization may be revoked at any time by you in writing, except if your health information has already been used or disclosed.  * Your health information that will be used or disclosed as a result of you signing this authorization could be re-disclosed by the recipient. If this occurs, your re-disclosed health information may no longer be protected by State or Federal laws.  * You may refuse to sign this Authorization. Your refusal will not affect your ability to obtain treatment.  * This Authorization becomes effective upon signing and will  on (date) __________.      If no date is indicated, this Authorization will  one (1) year from the signature date.    Name: Maritza Roberts  Signature: Date:   3/14/2023     PLEASE FAX  REQUESTED RECORDS BACK TO: [phone]

## 2023-05-03 DIAGNOSIS — R10.84 GENERALIZED ABDOMINAL PAIN: ICD-10-CM

## 2023-05-03 DIAGNOSIS — R22.2 ABDOMINAL WALL MASS: ICD-10-CM

## 2023-05-17 ENCOUNTER — HOSPITAL ENCOUNTER (OUTPATIENT)
Dept: RADIOLOGY | Facility: MEDICAL CENTER | Age: 48
End: 2023-05-17
Attending: STUDENT IN AN ORGANIZED HEALTH CARE EDUCATION/TRAINING PROGRAM
Payer: COMMERCIAL

## 2023-05-17 DIAGNOSIS — R10.84 GENERALIZED ABDOMINAL PAIN: ICD-10-CM

## 2023-05-17 DIAGNOSIS — R22.2 ABDOMINAL WALL MASS: ICD-10-CM

## 2023-05-17 PROCEDURE — 74150 CT ABDOMEN W/O CONTRAST: CPT
